# Patient Record
Sex: MALE | Race: WHITE | NOT HISPANIC OR LATINO | Employment: FULL TIME | URBAN - METROPOLITAN AREA
[De-identification: names, ages, dates, MRNs, and addresses within clinical notes are randomized per-mention and may not be internally consistent; named-entity substitution may affect disease eponyms.]

---

## 2023-03-09 PROBLEM — F41.9 ANXIETY: Status: ACTIVE | Noted: 2023-03-09

## 2023-03-09 PROBLEM — G47.00 INSOMNIA: Status: ACTIVE | Noted: 2023-03-09

## 2023-03-09 PROBLEM — M70.70 HIP BURSITIS: Status: ACTIVE | Noted: 2023-03-09

## 2023-03-09 PROBLEM — R10.9 ABDOMINAL PAIN: Status: ACTIVE | Noted: 2023-03-09

## 2023-03-09 PROBLEM — M25.551 HIP PAIN, RIGHT: Status: ACTIVE | Noted: 2023-03-09

## 2023-03-09 PROBLEM — R31.9 HEMATURIA: Status: ACTIVE | Noted: 2023-03-09

## 2023-03-09 PROBLEM — N20.1 LEFT URETERAL CALCULUS: Status: ACTIVE | Noted: 2023-03-09

## 2023-03-09 PROBLEM — H52.10 MYOPIA WITH PRESBYOPIA: Status: ACTIVE | Noted: 2023-03-09

## 2023-03-09 PROBLEM — H52.4 MYOPIA WITH PRESBYOPIA: Status: ACTIVE | Noted: 2023-03-09

## 2023-03-09 PROBLEM — H25.813 COMBINED FORM OF AGE-RELATED CATARACT, BOTH EYES: Status: ACTIVE | Noted: 2023-03-09

## 2023-03-09 PROBLEM — F41.8 DEPRESSION WITH ANXIETY: Status: ACTIVE | Noted: 2023-03-09

## 2023-03-09 PROBLEM — N20.0 NEPHROLITHIASIS: Status: ACTIVE | Noted: 2023-03-09

## 2023-03-09 PROBLEM — G47.9 SLEEP DISORDER: Status: ACTIVE | Noted: 2023-03-09

## 2023-03-09 PROBLEM — H18.519 FUCHS' ENDOTHELIAL DYSTROPHY: Status: ACTIVE | Noted: 2023-03-09

## 2023-03-09 PROBLEM — J30.9 ALLERGIC RHINITIS: Status: ACTIVE | Noted: 2023-03-09

## 2023-03-09 RX ORDER — SERTRALINE HYDROCHLORIDE 50 MG/1
50 TABLET, FILM COATED ORAL DAILY
COMMUNITY
Start: 2021-09-03 | End: 2024-05-22 | Stop reason: WASHOUT

## 2023-03-15 ENCOUNTER — OFFICE VISIT (OUTPATIENT)
Dept: PRIMARY CARE | Facility: CLINIC | Age: 68
End: 2023-03-15
Payer: COMMERCIAL

## 2023-03-15 VITALS
RESPIRATION RATE: 14 BRPM | HEART RATE: 80 BPM | OXYGEN SATURATION: 96 % | TEMPERATURE: 98.7 F | HEIGHT: 68 IN | SYSTOLIC BLOOD PRESSURE: 130 MMHG | BODY MASS INDEX: 28.34 KG/M2 | DIASTOLIC BLOOD PRESSURE: 70 MMHG | WEIGHT: 187 LBS

## 2023-03-15 DIAGNOSIS — M54.50 ACUTE BILATERAL LOW BACK PAIN WITHOUT SCIATICA: Primary | ICD-10-CM

## 2023-03-15 DIAGNOSIS — R52 PAIN: ICD-10-CM

## 2023-03-15 DIAGNOSIS — Z00.00 PERIODIC HEALTH ASSESSMENT, GENERAL SCREENING, ADULT: ICD-10-CM

## 2023-03-15 DIAGNOSIS — R97.20 ELEVATED PSA: ICD-10-CM

## 2023-03-15 PROCEDURE — 99213 OFFICE O/P EST LOW 20 MIN: CPT | Performed by: INTERNAL MEDICINE

## 2023-03-15 PROCEDURE — 1159F MED LIST DOCD IN RCRD: CPT | Performed by: INTERNAL MEDICINE

## 2023-03-15 PROCEDURE — 1036F TOBACCO NON-USER: CPT | Performed by: INTERNAL MEDICINE

## 2023-03-15 RX ORDER — DIPHENHYDRAMINE HCL 25 MG
CAPSULE ORAL EVERY 6 HOURS PRN
COMMUNITY
End: 2024-05-22 | Stop reason: WASHOUT

## 2023-03-15 ASSESSMENT — ENCOUNTER SYMPTOMS
BACK PAIN: 1
WEIGHT LOSS: 0
FEVER: 0
WEAKNESS: 0
HEADACHES: 0
PARESIS: 0
TINGLING: 0
PERIANAL NUMBNESS: 0
BOWEL INCONTINENCE: 0
PARESTHESIAS: 0
ABDOMINAL PAIN: 0
NUMBNESS: 0
DYSURIA: 0
LEG PAIN: 0

## 2023-03-15 NOTE — PROGRESS NOTES
"Subjective   Patient ID: Luiz Moya is a 67 y.o. male who presents for      Back Pain  The current episode started more than 1 year ago. The problem occurs intermittently. The problem is unchanged. The pain is present in the lumbar spine. The quality of the pain is described as aching. The pain does not radiate. The pain is at a severity of 3/10. The pain is mild. The pain is Worse during the day. The symptoms are aggravated by sitting and bending. Stiffness is present In the morning. Pertinent negatives include no abdominal pain, bladder incontinence, bowel incontinence, chest pain, dysuria, fever, headaches, leg pain, numbness, paresis, paresthesias, pelvic pain, perianal numbness, tingling, weakness or weight loss. He has tried walking for the symptoms.        Review of Systems   Constitutional:  Negative for fever and weight loss.   Cardiovascular:  Negative for chest pain.   Gastrointestinal:  Negative for abdominal pain and bowel incontinence.   Genitourinary:  Negative for bladder incontinence, dysuria and pelvic pain.   Musculoskeletal:  Positive for back pain.   Neurological:  Negative for tingling, weakness, numbness, headaches and paresthesias.       Objective   /70 (BP Location: Right arm, Patient Position: Sitting, BP Cuff Size: Adult)   Pulse 80   Temp 37.1 °C (98.7 °F) (Tympanic)   Resp 14   Ht 1.727 m (5' 8\")   Wt 84.8 kg (187 lb)   SpO2 96%   BMI 28.43 kg/m²     Physical Exam    Assessment/Plan   Problem List Items Addressed This Visit    None  Visit Diagnoses       Acute bilateral low back pain without sciatica    -  Primary    Pain        Relevant Orders    XR lumbar spine 2-3 views    Periodic health assessment, general screening, adult        Relevant Orders    CBC    Comprehensive Metabolic Panel    Lipid Panel    Thyroid Stimulating Hormone    Elevated PSA        Relevant Orders    Prostate Specific Antigen        Reviewed and discussed all of the above.    Back has been up and " down and rates it about a 3/10.  He does no recall any injury.  No radiation.  No F/C/S and no other red flags.    Because it is midline we will proceed with xrays to be sure no structural issues.    We will also treat with NSAIDs and GI prophylaxis.    We will see how the xrays look and see how responds and proceed from there.    He is also due for labs etc.  Will follow up in 2 months for APE and reassessment - sooner if any issues.

## 2023-03-17 ENCOUNTER — TELEPHONE (OUTPATIENT)
Dept: PRIMARY CARE | Facility: CLINIC | Age: 68
End: 2023-03-17
Payer: COMMERCIAL

## 2023-03-17 NOTE — TELEPHONE ENCOUNTER
----- Message from Jerel Rodriguez DO sent at 3/17/2023  9:30 AM EDT -----  Xray looks ok - positive for arthritis of the spine but nothing concerning.  The anti-inflammatories should help.

## 2023-05-17 ENCOUNTER — APPOINTMENT (OUTPATIENT)
Dept: PRIMARY CARE | Facility: CLINIC | Age: 68
End: 2023-05-17
Payer: COMMERCIAL

## 2023-08-10 ENCOUNTER — TELEPHONE (OUTPATIENT)
Dept: PRIMARY CARE | Facility: CLINIC | Age: 68
End: 2023-08-10
Payer: COMMERCIAL

## 2023-08-10 DIAGNOSIS — Z00.00 PERIODIC HEALTH ASSESSMENT, GENERAL SCREENING, ADULT: ICD-10-CM

## 2023-08-10 DIAGNOSIS — Z00.00 PERIODIC HEALTH ASSESSMENT, GENERAL SCREENING, ADULT: Primary | ICD-10-CM

## 2023-08-10 RX ORDER — ATOVAQUONE AND PROGUANIL HYDROCHLORIDE 250; 100 MG/1; MG/1
1 TABLET, FILM COATED ORAL DAILY
Qty: 22 TABLET | Refills: 0 | Status: SHIPPED | OUTPATIENT
Start: 2023-08-10 | End: 2023-08-10 | Stop reason: SDUPTHER

## 2023-08-10 RX ORDER — ATOVAQUONE AND PROGUANIL HYDROCHLORIDE 250; 100 MG/1; MG/1
1 TABLET, FILM COATED ORAL DAILY
Qty: 22 TABLET | Refills: 0 | Status: SHIPPED | OUTPATIENT
Start: 2023-08-10 | End: 2024-05-22 | Stop reason: WASHOUT

## 2024-05-22 ENCOUNTER — OFFICE VISIT (OUTPATIENT)
Dept: DERMATOLOGY | Facility: CLINIC | Age: 69
End: 2024-05-22
Payer: COMMERCIAL

## 2024-05-22 DIAGNOSIS — L81.4 LENTIGO: ICD-10-CM

## 2024-05-22 DIAGNOSIS — D18.01 HEMANGIOMA OF SKIN: ICD-10-CM

## 2024-05-22 DIAGNOSIS — L57.0 ACTINIC KERATOSIS: ICD-10-CM

## 2024-05-22 DIAGNOSIS — D22.9 MELANOCYTIC NEVUS, UNSPECIFIED LOCATION: Primary | ICD-10-CM

## 2024-05-22 DIAGNOSIS — Z12.83 ENCOUNTER FOR SCREENING FOR MALIGNANT NEOPLASM OF SKIN: ICD-10-CM

## 2024-05-22 DIAGNOSIS — L82.1 SEBORRHEIC KERATOSIS: ICD-10-CM

## 2024-05-22 PROCEDURE — 99203 OFFICE O/P NEW LOW 30 MIN: CPT | Performed by: DERMATOLOGY

## 2024-05-22 PROCEDURE — 1036F TOBACCO NON-USER: CPT | Performed by: DERMATOLOGY

## 2024-05-22 PROCEDURE — 17000 DESTRUCT PREMALG LESION: CPT | Performed by: DERMATOLOGY

## 2024-05-22 PROCEDURE — 1159F MED LIST DOCD IN RCRD: CPT | Performed by: DERMATOLOGY

## 2024-05-22 PROCEDURE — 17003 DESTRUCT PREMALG LES 2-14: CPT | Performed by: DERMATOLOGY

## 2024-05-22 ASSESSMENT — DERMATOLOGY PATIENT ASSESSMENT
DO YOU USE A TANNING BED: NO
HAVE YOU HAD OR DO YOU HAVE VASCULAR DISEASE: NO
WHERE ARE THESE NEW OR CHANGING LESIONS LOCATED: FOREHEAD
DO YOU HAVE ANY NEW OR CHANGING LESIONS: YES
DO YOU USE SUNSCREEN: OCCASIONALLY
HAVE YOU HAD OR DO YOU HAVE A STAPH INFECTION: NO
ARE YOU AN ORGAN TRANSPLANT RECIPIENT: NO

## 2024-05-22 ASSESSMENT — DERMATOLOGY QUALITY OF LIFE (QOL) ASSESSMENT
WHAT SINGLE SKIN CONDITION LISTED BELOW IS THE PATIENT ANSWERING THE QUALITY-OF-LIFE ASSESSMENT QUESTIONS ABOUT: NONE OF THE ABOVE
RATE HOW EMOTIONALLY BOTHERED YOU ARE BY YOUR SKIN PROBLEM (FOR EXAMPLE, WORRY, EMBARRASSMENT, FRUSTRATION): 0 - NEVER BOTHERED
ARE THERE EXCLUSIONS OR EXCEPTIONS FOR THE QUALITY OF LIFE ASSESSMENT: NO
DATE THE QUALITY-OF-LIFE ASSESSMENT WAS COMPLETED: 66982
RATE HOW BOTHERED YOU ARE BY SYMPTOMS OF YOUR SKIN PROBLEM (EG, ITCHING, STINGING BURNING, HURTING OR SKIN IRRITATION): 0 - NEVER BOTHERED
RATE HOW BOTHERED YOU ARE BY EFFECTS OF YOUR SKIN PROBLEMS ON YOUR ACTIVITIES (EG, GOING OUT, ACCOMPLISHING WHAT YOU WANT, WORK ACTIVITIES OR YOUR RELATIONSHIPS WITH OTHERS): 0 - NEVER BOTHERED

## 2024-05-22 ASSESSMENT — ITCH NUMERIC RATING SCALE: HOW SEVERE IS YOUR ITCHING?: 0

## 2024-05-22 ASSESSMENT — PATIENT GLOBAL ASSESSMENT (PGA): PATIENT GLOBAL ASSESSMENT: PATIENT GLOBAL ASSESSMENT:  1 - CLEAR

## 2024-05-22 NOTE — PROGRESS NOTES
Subjective     Luiz Moya is a 68 y.o. male who presents for the following: Skin Check.     Skin Cancer Screening  He has a history of moderate sun exposure. He is in the sun occasionally. He uses sunscreen occasionally. He reports no skin symptoms. His moles are not changing.    Spots that concern him: forehead      Review of Systems:  No other skin or systemic complaints other than what is documented elsewhere in the note.    The following portions of the chart were reviewed this encounter and updated as appropriate:       Skin Cancer History  No skin cancer on file.    Specialty Problems    None    Past Medical History:  Luiz Moya  has no past medical history on file.    Past Surgical History:  Luiz Moya  has a past surgical history that includes Other surgical history (03/02/2020) and Other surgical history (03/02/2020).    Family History:  Patient family history includes Diabetes in his mother; Hypertension in his father.       Objective   Well appearing patient in no apparent distress; mood and affect are within normal limits.    A full examination was performed including scalp, head, eyes, ears, nose, lips, neck, chest, axillae, abdomen, back, buttocks, bilateral upper extremities, bilateral lower extremities, hands, feet, fingers, toes, fingernails, and toenails. All findings within normal limits unless otherwise noted below.    Assessment/Plan   1. Actinic keratosis (2)  Scalp (2)  Destruction of Actinic Keratosis Procedure Note  Diagnosis: AK  Location: see physical exam  Informed consent: Discussed risks (permanent scarring, light or dark discoloration, infection, pain, bleeding, bruising, redness, blister formation, and recurrence of the lesion) and benefits of the procedure, as well as the alternatives.  Informed consent was obtained.  Anesthesia: not required  Procedure Details:  The lesion was destroyed with liquid nitrogen. The patient tolerated procedure well.  Total number of lesions  treated:  2  Plan:  The patient was instructed on post-op care. Recommend OTC analgesia as needed for pain.      Destr of lesion - Scalp  Complexity: simple    Destruction method: cryotherapy    Informed consent: discussed and consent obtained    Lesion destroyed using liquid nitrogen: Yes    Cryotherapy cycles:  1  Outcome: patient tolerated procedure well with no complications    Post-procedure details: wound care instructions given      2. Encounter for screening for malignant neoplasm of skin    3. Hemangioma of skin  Scattered cherry-red papule(s).    4. Melanocytic nevus, unspecified location  All nevi were symmetric brown macules without atypia on dermoscopy.     The ABCDEs of melanoma and warning signs of non-melanoma skin cancer were discussed with patient and patient expressed understanding. Sun protection and use of at least SPF 30 discussed with patient. Pt instructed to reapply every 2 hours.     5. Lentigo  Scattered tan macules in sun-exposed areas.    The ABCDEs of melanoma and warning signs of non-melanoma skin cancer were discussed with patient and patient expressed understanding. Sun protection and use of at least SPF 30 discussed with patient. Pt instructed to reapply every 2 hours.     6. Seborrheic keratosis  Stuck on verrucous, tan-brown papules and plaques.        Follow up in 1 year or sooner as needed.

## 2025-02-07 ENCOUNTER — APPOINTMENT (OUTPATIENT)
Dept: URGENT CARE | Age: 70
End: 2025-02-07
Payer: COMMERCIAL

## 2025-05-28 ENCOUNTER — APPOINTMENT (OUTPATIENT)
Dept: DERMATOLOGY | Facility: CLINIC | Age: 70
End: 2025-05-28
Payer: COMMERCIAL

## 2025-07-09 ENCOUNTER — APPOINTMENT (OUTPATIENT)
Dept: DERMATOLOGY | Facility: CLINIC | Age: 70
End: 2025-07-09
Payer: COMMERCIAL

## 2025-07-31 ENCOUNTER — APPOINTMENT (OUTPATIENT)
Dept: DERMATOLOGY | Facility: CLINIC | Age: 70
End: 2025-07-31
Payer: COMMERCIAL

## 2025-07-31 DIAGNOSIS — D22.9 MELANOCYTIC NEVUS, UNSPECIFIED LOCATION: Primary | ICD-10-CM

## 2025-07-31 DIAGNOSIS — D18.01 HEMANGIOMA OF SKIN: ICD-10-CM

## 2025-07-31 DIAGNOSIS — L81.4 LENTIGO: ICD-10-CM

## 2025-07-31 DIAGNOSIS — L82.1 SEBORRHEIC KERATOSIS: ICD-10-CM

## 2025-07-31 DIAGNOSIS — Z12.83 ENCOUNTER FOR SCREENING FOR MALIGNANT NEOPLASM OF SKIN: ICD-10-CM

## 2025-07-31 PROCEDURE — 99213 OFFICE O/P EST LOW 20 MIN: CPT | Performed by: DERMATOLOGY

## 2025-07-31 PROCEDURE — 1159F MED LIST DOCD IN RCRD: CPT | Performed by: DERMATOLOGY

## 2025-07-31 NOTE — PROGRESS NOTES
Subjective     Luiz Moya is a 69 y.o. male who presents for the following: Skin Check (FBSE. Hx of AK. Area of concern to neck, right side of neck, and to left lower leg).     Skin Cancer Screening  He has a history of moderate sun exposure. He is in the sun daily. He uses sunscreen never. He reports no skin symptoms. His moles are not changing.    Spots that concern him: neck, right side of neck, and to left lower leg    Review of Systems:  No other skin or systemic complaints other than what is documented elsewhere in the note.    The following portions of the chart were reviewed this encounter and updated as appropriate:       Skin Cancer History  Biopsy Log Book  No skin cancers from Specimen Tracking.    Additional History      Specialty Problems    None    Past Medical History:  Luiz Moya  has no past medical history on file.    Past Surgical History:  Luiz Moya  has a past surgical history that includes Other surgical history (03/02/2020) and Other surgical history (03/02/2020).    Family History:  Patient family history includes Diabetes in his mother; Hypertension in his father.       Objective   Well appearing patient in no apparent distress; mood and affect are within normal limits.    A full examination was performed including scalp, head, eyes, ears, nose, lips, neck, chest, axillae, abdomen, back, buttocks, bilateral upper extremities, bilateral lower extremities, hands, feet, fingers, toes, fingernails, and toenails. All findings within normal limits unless otherwise noted below.    Assessment/Plan   Skin Exam  1. ENCOUNTER FOR SCREENING FOR MALIGNANT NEOPLASM OF SKIN  Generalized  2. HEMANGIOMA OF SKIN  Generalized  Scattered cherry-red papule(s).  3. MELANOCYTIC NEVUS, UNSPECIFIED LOCATION  Generalized  All nevi were symmetric brown macules without atypia on dermoscopy.   The ABCDEs of melanoma and warning signs of non-melanoma skin cancer were discussed with patient and patient expressed  understanding. Sun protection and use of at least SPF 30 discussed with patient. Pt instructed to reapply every 2 hours.   4. LENTIGO  Generalized  Scattered tan macules in sun-exposed areas.  The ABCDEs of melanoma and warning signs of non-melanoma skin cancer were discussed with patient and patient expressed understanding. Sun protection and use of at least SPF 30 discussed with patient. Pt instructed to reapply every 2 hours.   5. SEBORRHEIC KERATOSIS  Generalized  Stuck on verrucous, tan-brown papules and plaques.     Follow up in 1 year or sooner as needed.

## 2026-08-03 ENCOUNTER — APPOINTMENT (OUTPATIENT)
Dept: DERMATOLOGY | Facility: CLINIC | Age: 71
End: 2026-08-03
Payer: COMMERCIAL